# Patient Record
Sex: FEMALE | Race: WHITE
[De-identification: names, ages, dates, MRNs, and addresses within clinical notes are randomized per-mention and may not be internally consistent; named-entity substitution may affect disease eponyms.]

---

## 2020-02-29 ENCOUNTER — HOSPITAL ENCOUNTER (EMERGENCY)
Dept: HOSPITAL 54 - ER | Age: 25
Discharge: HOME | End: 2020-02-29
Payer: MEDICAID

## 2020-02-29 VITALS — HEIGHT: 66 IN | BODY MASS INDEX: 20.09 KG/M2 | WEIGHT: 125 LBS

## 2020-02-29 VITALS — DIASTOLIC BLOOD PRESSURE: 70 MMHG | SYSTOLIC BLOOD PRESSURE: 123 MMHG
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
## 2020-02-29 DIAGNOSIS — M54.2: ICD-10-CM

## 2020-02-29 DIAGNOSIS — S80.811A: ICD-10-CM
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
## 2020-02-29 DIAGNOSIS — M54.6: ICD-10-CM
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
## 2020-02-29 DIAGNOSIS — S80.01XA: Primary | ICD-10-CM
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
